# Patient Record
Sex: MALE | Race: OTHER | NOT HISPANIC OR LATINO | URBAN - METROPOLITAN AREA
[De-identification: names, ages, dates, MRNs, and addresses within clinical notes are randomized per-mention and may not be internally consistent; named-entity substitution may affect disease eponyms.]

---

## 2017-10-01 ENCOUNTER — OUTPATIENT (OUTPATIENT)
Dept: OUTPATIENT SERVICES | Facility: HOSPITAL | Age: 62
LOS: 1 days | End: 2017-10-01
Payer: MEDICAID

## 2017-10-01 PROCEDURE — G9001: CPT

## 2017-10-12 DIAGNOSIS — R69 ILLNESS, UNSPECIFIED: ICD-10-CM

## 2019-05-08 PROBLEM — Z00.00 ENCOUNTER FOR PREVENTIVE HEALTH EXAMINATION: Status: ACTIVE | Noted: 2019-05-08

## 2019-05-22 ENCOUNTER — APPOINTMENT (OUTPATIENT)
Dept: SURGERY | Facility: CLINIC | Age: 64
End: 2019-05-22
Payer: MEDICAID

## 2019-05-22 VITALS
BODY MASS INDEX: 24.52 KG/M2 | RESPIRATION RATE: 16 BRPM | TEMPERATURE: 97.8 F | OXYGEN SATURATION: 99 % | SYSTOLIC BLOOD PRESSURE: 134 MMHG | WEIGHT: 185 LBS | HEART RATE: 67 BPM | HEIGHT: 73 IN | DIASTOLIC BLOOD PRESSURE: 76 MMHG

## 2019-05-22 DIAGNOSIS — Z78.9 OTHER SPECIFIED HEALTH STATUS: ICD-10-CM

## 2019-05-22 DIAGNOSIS — Z87.898 PERSONAL HISTORY OF OTHER SPECIFIED CONDITIONS: ICD-10-CM

## 2019-05-22 DIAGNOSIS — K42.9 UMBILICAL HERNIA W/OUT OBSTRUCTION OR GANGRENE: ICD-10-CM

## 2019-05-22 DIAGNOSIS — Z80.3 FAMILY HISTORY OF MALIGNANT NEOPLASM OF BREAST: ICD-10-CM

## 2019-05-22 DIAGNOSIS — Z86.79 PERSONAL HISTORY OF OTHER DISEASES OF THE CIRCULATORY SYSTEM: ICD-10-CM

## 2019-05-22 DIAGNOSIS — Z86.39 PERSONAL HISTORY OF OTHER ENDOCRINE, NUTRITIONAL AND METABOLIC DISEASE: ICD-10-CM

## 2019-05-22 PROCEDURE — 99244 OFF/OP CNSLTJ NEW/EST MOD 40: CPT

## 2019-05-22 RX ORDER — RAMIPRIL 5 MG/1
5 CAPSULE ORAL
Refills: 0 | Status: ACTIVE | COMMUNITY

## 2019-05-22 RX ORDER — METOPROLOL TARTRATE 25 MG/1
25 TABLET, FILM COATED ORAL
Refills: 0 | Status: ACTIVE | COMMUNITY

## 2019-05-22 RX ORDER — METFORMIN HYDROCHLORIDE 500 MG/1
500 TABLET, COATED ORAL
Refills: 0 | Status: ACTIVE | COMMUNITY

## 2019-05-22 NOTE — ASSESSMENT
[FreeTextEntry1] : The patient is a 63 year old male with pancytopenia, splenomegaly, and left upper abdominal pain.  The underlying etiology of the splenomegaly and pancytopenia is unclear but concerning for possible lymphoma involving the spleen.  Given the fact that the spleen is not mobile on exam and feels fixed in position, I have recommended a repeat CT scan to further evaluate the spleen for potential involvement of adjacent structures. The patient was advised of the risks, benefits, and alternatives including the option of doing nothing to a potential robotic, possible laparoscopic, and possible open splenectomy were discussed.  The potential complications including but not limited to infection, bleeding, overwhelming post splenectomy sepsis, bowel injury, incisional herniation, chronic pain, and pancreatic fistula were discussed.  The patient admitted understanding and will follow up upon completion of the CT scan for further recommendations.  Regarding the umbilical hernia he has been advised we can address this at the same setting.

## 2019-05-22 NOTE — HISTORY OF PRESENT ILLNESS
[de-identified] : The patient comes to the office in consultation by Dr. Jeyson Pope for evaluation of splenomegaly, pancytopenia, and left upper abdominal pain. The patient states over the past year he has had a 12 to 15 pound unintentional weight loss with a loss of appetite.  He reports feeling intermittent left upper abdominal discomfort.  He has no nausea, no vomit, no fevers or chills.  He reports that about one year ago he was diagnosed with a low white blood cell count, followed by thrombocytopenia.

## 2019-05-22 NOTE — REASON FOR VISIT
[Consultation] : a consultation visit [FreeTextEntry1] : splenomegaly, pancytopenia, and left upper abdominal pain

## 2019-05-22 NOTE — DATA REVIEWED
[FreeTextEntry1] : Independent review of the PET CT scan report from 1/17/19 reveals splenomegaly of up to 16 cm with increased uptake suspicious for an inflammatory process, infection, and/or lymphoma, there is no other abnormal uptake seen

## 2019-05-22 NOTE — CONSULT LETTER
[Dear  ___] : Dear  [unfilled], [Consult Letter:] : I had the pleasure of evaluating your patient, [unfilled]. [( Thank you for referring [unfilled] for consultation for _____ )] : Thank you for referring [unfilled] for consultation for [unfilled] [Please see my note below.] : Please see my note below. [Consult Closing:] : Thank you very much for allowing me to participate in the care of this patient.  If you have any questions, please do not hesitate to contact me. [Sincerely,] : Sincerely, [FreeTextEntry3] : Santana Fitch MD, FACS\par  of Surgery\par [facility]\par

## 2019-05-22 NOTE — PHYSICAL EXAM
[JVD] : no jugular venous distention  [No Rash or Lesion] : No rash or lesion [Purpura] : no purpura  [Petechiae] : no petechiae [Skin Ulcer] : no ulcer [Skin Induration] : no induration [Alert] : alert [Oriented to Person] : oriented to person [Oriented to Place] : oriented to place [Oriented to Time] : oriented to time [Calm] : calm [de-identified] : non toxic, in no acute distress  [de-identified] : NC/AT PERRL EOMI no scleral icterus  [de-identified] : trachea midline, no gross mass, no lymphadenopathy  [de-identified] : no audible wheezing or stridor  [de-identified] : soft, mild tenderness to palpation of the left upper abdomen, there is splenomegaly with mild tenderness, the palpable spleen is not mobile to palpation  [de-identified] : FROM of all extremities with no gross deformity, no angulation, there is a small reducible umbilical hernia without tenderness [de-identified] : mood is calm

## 2019-06-05 ENCOUNTER — OUTPATIENT (OUTPATIENT)
Dept: OUTPATIENT SERVICES | Facility: HOSPITAL | Age: 64
LOS: 1 days | End: 2019-06-05
Payer: MEDICAID

## 2019-06-05 ENCOUNTER — APPOINTMENT (OUTPATIENT)
Dept: CT IMAGING | Facility: CLINIC | Age: 64
End: 2019-06-05
Payer: MEDICAID

## 2019-06-05 DIAGNOSIS — D61.818 OTHER PANCYTOPENIA: ICD-10-CM

## 2019-06-05 DIAGNOSIS — R16.1 SPLENOMEGALY, NOT ELSEWHERE CLASSIFIED: ICD-10-CM

## 2019-06-05 PROCEDURE — 74177 CT ABD & PELVIS W/CONTRAST: CPT

## 2019-06-05 PROCEDURE — 74177 CT ABD & PELVIS W/CONTRAST: CPT | Mod: 26

## 2019-06-05 PROCEDURE — 82565 ASSAY OF CREATININE: CPT

## 2019-06-14 ENCOUNTER — APPOINTMENT (OUTPATIENT)
Dept: SURGERY | Facility: CLINIC | Age: 64
End: 2019-06-14
Payer: MEDICAID

## 2019-06-14 VITALS
RESPIRATION RATE: 16 BRPM | SYSTOLIC BLOOD PRESSURE: 111 MMHG | HEIGHT: 73 IN | HEART RATE: 63 BPM | WEIGHT: 187 LBS | DIASTOLIC BLOOD PRESSURE: 71 MMHG | BODY MASS INDEX: 24.78 KG/M2 | OXYGEN SATURATION: 100 % | TEMPERATURE: 98.7 F

## 2019-06-14 DIAGNOSIS — R10.812 LEFT UPPER QUADRANT ABDOMINAL TENDERNESS: ICD-10-CM

## 2019-06-14 DIAGNOSIS — D61.818 OTHER PANCYTOPENIA: ICD-10-CM

## 2019-06-14 PROCEDURE — 99214 OFFICE O/P EST MOD 30 MIN: CPT

## 2019-06-14 NOTE — DATA REVIEWED
[FreeTextEntry1] : Independent review of the abd/pel CT scan reveals splenomegaly, peripancreatic lymphadenopathy, periportal lymphadenopathy, there is no clear inflammation or stranding around the spleen, there appears to be no bowel obstruction or thickening.

## 2019-06-14 NOTE — HISTORY OF PRESENT ILLNESS
[de-identified] : The patient returns to the office with no new complaints. He reports that there has been interval improvement of the left upper abdominal discomfort. He reports early satiety, no nausea, no vomit, no fevers, or chills.

## 2019-06-14 NOTE — ASSESSMENT
[FreeTextEntry1] : The patient is a 64 year old male with pancytopenia, splenomegaly, and left upper abdominal pain.  The patient at this point has been advised that he will benefit from splenectomy to exclude malignancy and to help manage the pancytopenia.  He will need to be vaccinated in preparation with Pneumavax, Menomune, and Hib vaccine.  He has been advised of the risks, benefits, and alternatives including the option of doing nothing to a robotic, possible laparoscopic, and possible open splenectomy.  The potential complications including but not limited to infection, bleeding, bowel injury, post splenectomy overwhelming sepsis were discussed. He admits understanding and agrees to proceed as planned.

## 2019-06-14 NOTE — PHYSICAL EXAM
[JVD] : no jugular venous distention  [No Rash or Lesion] : No rash or lesion [Purpura] : no purpura  [Skin Ulcer] : no ulcer [Petechiae] : no petechiae [Skin Induration] : no induration [Oriented to Person] : oriented to person [Alert] : alert [Oriented to Place] : oriented to place [Oriented to Time] : oriented to time [Calm] : calm [de-identified] : non toxic, in no acute distress  [de-identified] : NC/AT PERRL EOMI no scleral icterus  [de-identified] : trachea midline, no gross mass, no lymphadenopathy  [de-identified] : no audible wheezing or stridor  [de-identified] : soft, no localizing tenderness to palpation of the left upper abdomen, there remains splenomegaly, the palpable spleen is not mobile to palpation  [de-identified] : FROM of all extremities with no gross deformity, no angulation, there is a small reducible umbilical hernia without tenderness [de-identified] : mood is calm

## 2019-06-27 ENCOUNTER — APPOINTMENT (OUTPATIENT)
Dept: SURGICAL ONCOLOGY | Facility: CLINIC | Age: 64
End: 2019-06-27
Payer: MEDICAID

## 2019-06-27 VITALS
HEART RATE: 68 BPM | TEMPERATURE: 98.4 F | SYSTOLIC BLOOD PRESSURE: 154 MMHG | HEIGHT: 73 IN | WEIGHT: 186 LBS | BODY MASS INDEX: 24.65 KG/M2 | DIASTOLIC BLOOD PRESSURE: 80 MMHG

## 2019-06-27 DIAGNOSIS — R16.1 SPLENOMEGALY, NOT ELSEWHERE CLASSIFIED: ICD-10-CM

## 2019-06-27 DIAGNOSIS — K74.60 UNSPECIFIED CIRRHOSIS OF LIVER: ICD-10-CM

## 2019-06-27 DIAGNOSIS — Z87.19 PERSONAL HISTORY OF OTHER DISEASES OF THE DIGESTIVE SYSTEM: ICD-10-CM

## 2019-06-27 PROCEDURE — 99204 OFFICE O/P NEW MOD 45 MIN: CPT

## 2019-06-27 NOTE — PHYSICAL EXAM
[Normal] : supple, no neck mass and thyroid not enlarged [Normal Neck Lymph Nodes] : normal neck lymph nodes  [Normal Supraclavicular Lymph Nodes] : normal supraclavicular lymph nodes [Normal Groin Lymph Nodes] : normal groin lymph nodes [Normal Axillary Lymph Nodes] : normal axillary lymph nodes [Normal] : oriented to person, place and time, with appropriate affect [de-identified] : soft NT ND; no ascites

## 2019-06-27 NOTE — ASSESSMENT
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  Splenectomy is not a good treatment option for him, given the A) morbidity associated with surgery in cirrhotic patients B) the reason for his pancytopenia is not absolute decrease in the volume of blood cells, but rather sequestration by the spleen.  He would also be at risk of splenic vein thrombosis\par \par Plan:\par 1) Called Dr. Fitch to discuss plan\par 2) Call out to Heme onc Dr. Cuello\par 3) Would hold off on surgery

## 2019-06-27 NOTE — CONSULT LETTER
[Dear  ___] : Dear  [unfilled], [Courtesy Letter:] : I had the pleasure of seeing your patient, [unfilled], in my office today. [Please see my note below.] : Please see my note below. [Consult Closing:] : Thank you very much for allowing me to participate in the care of this patient.  If you have any questions, please do not hesitate to contact me. [Sincerely,] : Sincerely, [DrEva  ___] : Dr. WALTERS [FreeTextEntry3] : Kameron Sue MD, MPH, FACS\par Surgical Oncology\par Assistant Professor of Surgery\par [facility] [employer] [facility]

## 2019-06-27 NOTE — HISTORY OF PRESENT ILLNESS
[de-identified] : 64 year old male presents for an initial consultation for splenomegaly, neutropenia and thrombocytopenia, referred by Dr. Fitch.  \par He was found to have a low WBC of 2.8 and Hb 13.8 and Platelet 135,000 on blood work in September 2018 by his PCP.  His medical history is only notable for DM type II (On Metformin) and HTN.   No surgical history. He denies any history of bleeding disorders, autoimmune disorders or any cancer.  Denies a history of blood transfusions, hepatitis or HIV.  No recent infections or antibiotics.  He was treated for left foot cellulitis in 2017.  Family history is significant for breast cancer in grandmother and sister.  He worked as a [employer] and is a lifetime non smoker.  He does admit to drinking heavily while going through his divorce but now only drinks beer occasionally.  \par \par He was seen by Dr. Jeyson Pope ([facility]) and underwent multiple diagnostic testing including an ultrasound and PET which showed an enlarged spleen but no other adenopathy.  He was also seen by Dr. Fitch to discuss a splenectomy. Bone marrow dry tap but biopsy shows atypical lymphoid infiltrate mostly T-Cells suspicious for lymphoma.  Differential diagnosis includes T-cell lymphoma, splenic HD, NK cell leukemia or splenic lymphoma. Patient received the Pneumovax Vaccine. \par \par CT Abdomen and Pelvis performed on 6/5/19 showed an enlarged spleen (22 cm with minimal capsular calcification), slightly lobulated appearance of liver surface suggestive of cirrhosis.  There is 1.2 cm periportal adenopathy and peripancreatic adenopathy measuring up to 1.8 cm. F/u CT or MRI is recommended in 3 months. \par \par The patient states over the past year he has had a 12 to 15 pound unintentional weight loss with a loss of appetite. He reports feeling intermittent left upper abdominal discomfort. He has no nausea, no vomit, no fevers or chills. \par \par Medications: Metformin 500 mg, Metoprolol 25 mg, Ramipril 5 mg \par Allergies: Vancomycin\par